# Patient Record
Sex: FEMALE | URBAN - METROPOLITAN AREA
[De-identification: names, ages, dates, MRNs, and addresses within clinical notes are randomized per-mention and may not be internally consistent; named-entity substitution may affect disease eponyms.]

---

## 2022-05-20 ENCOUNTER — APPOINTMENT (RX ONLY)
Dept: URBAN - METROPOLITAN AREA CLINIC 124 | Facility: CLINIC | Age: 37
Setting detail: DERMATOLOGY
End: 2022-05-20

## 2022-05-20 DIAGNOSIS — L81.4 OTHER MELANIN HYPERPIGMENTATION: ICD-10-CM

## 2022-05-20 DIAGNOSIS — Z71.89 OTHER SPECIFIED COUNSELING: ICD-10-CM

## 2022-05-20 DIAGNOSIS — L81.0 POSTINFLAMMATORY HYPERPIGMENTATION: ICD-10-CM

## 2022-05-20 DIAGNOSIS — D22 MELANOCYTIC NEVI: ICD-10-CM

## 2022-05-20 DIAGNOSIS — D18.0 HEMANGIOMA: ICD-10-CM

## 2022-05-20 DIAGNOSIS — L57.3 POIKILODERMA OF CIVATTE: ICD-10-CM

## 2022-05-20 PROBLEM — D22.5 MELANOCYTIC NEVI OF TRUNK: Status: ACTIVE | Noted: 2022-05-20

## 2022-05-20 PROBLEM — D18.01 HEMANGIOMA OF SKIN AND SUBCUTANEOUS TISSUE: Status: ACTIVE | Noted: 2022-05-20

## 2022-05-20 PROCEDURE — 99203 OFFICE O/P NEW LOW 30 MIN: CPT

## 2022-05-20 PROCEDURE — ? COUNSELING

## 2022-05-20 PROCEDURE — ? SUNSCREEN RECOMMENDATIONS

## 2022-05-20 PROCEDURE — ? TREATMENT REGIMEN

## 2022-05-20 ASSESSMENT — LOCATION ZONE DERM: LOCATION ZONE: TRUNK

## 2022-05-20 ASSESSMENT — LOCATION DETAILED DESCRIPTION DERM
LOCATION DETAILED: LEFT RIB CAGE
LOCATION DETAILED: EPIGASTRIC SKIN
LOCATION DETAILED: SUPERIOR THORACIC SPINE
LOCATION DETAILED: UPPER STERNUM
LOCATION DETAILED: LEFT INFERIOR UPPER BACK
LOCATION DETAILED: LEFT MEDIAL SUPERIOR CHEST

## 2022-05-20 ASSESSMENT — LOCATION SIMPLE DESCRIPTION DERM
LOCATION SIMPLE: UPPER BACK
LOCATION SIMPLE: ABDOMEN
LOCATION SIMPLE: CHEST
LOCATION SIMPLE: LEFT UPPER BACK

## 2024-06-14 ENCOUNTER — OFFICE VISIT (OUTPATIENT)
Dept: URGENT CARE | Facility: CLINIC | Age: 39
End: 2024-06-14
Payer: COMMERCIAL

## 2024-06-14 VITALS
HEIGHT: 60 IN | DIASTOLIC BLOOD PRESSURE: 82 MMHG | BODY MASS INDEX: 29.06 KG/M2 | WEIGHT: 148 LBS | HEART RATE: 100 BPM | SYSTOLIC BLOOD PRESSURE: 114 MMHG | TEMPERATURE: 98 F | RESPIRATION RATE: 16 BRPM | OXYGEN SATURATION: 96 %

## 2024-06-14 DIAGNOSIS — R05.9 COUGH, UNSPECIFIED TYPE: ICD-10-CM

## 2024-06-14 DIAGNOSIS — R06.2 WHEEZING: ICD-10-CM

## 2024-06-14 DIAGNOSIS — J18.9 PNEUMONIA OF RIGHT LOWER LOBE DUE TO INFECTIOUS ORGANISM: Primary | ICD-10-CM

## 2024-06-14 LAB
CTP QC/QA: YES
SARS-COV-2 AG RESP QL IA.RAPID: NEGATIVE

## 2024-06-14 PROCEDURE — 96372 THER/PROPH/DIAG INJ SC/IM: CPT | Mod: S$GLB,,, | Performed by: STUDENT IN AN ORGANIZED HEALTH CARE EDUCATION/TRAINING PROGRAM

## 2024-06-14 PROCEDURE — 71046 X-RAY EXAM CHEST 2 VIEWS: CPT | Mod: 26,,, | Performed by: RADIOLOGY

## 2024-06-14 PROCEDURE — 71046 X-RAY EXAM CHEST 2 VIEWS: CPT | Mod: TC,,, | Performed by: STUDENT IN AN ORGANIZED HEALTH CARE EDUCATION/TRAINING PROGRAM

## 2024-06-14 PROCEDURE — 99204 OFFICE O/P NEW MOD 45 MIN: CPT | Mod: 25,S$GLB,, | Performed by: STUDENT IN AN ORGANIZED HEALTH CARE EDUCATION/TRAINING PROGRAM

## 2024-06-14 PROCEDURE — 87811 SARS-COV-2 COVID19 W/OPTIC: CPT | Mod: QW,S$GLB,, | Performed by: STUDENT IN AN ORGANIZED HEALTH CARE EDUCATION/TRAINING PROGRAM

## 2024-06-14 RX ORDER — ALBUTEROL SULFATE 90 UG/1
2 AEROSOL, METERED RESPIRATORY (INHALATION) EVERY 6 HOURS PRN
Qty: 6.7 G | Refills: 0 | Status: SHIPPED | OUTPATIENT
Start: 2024-06-14 | End: 2025-06-14

## 2024-06-14 RX ORDER — CEFDINIR 300 MG/1
300 CAPSULE ORAL 2 TIMES DAILY
Qty: 8 CAPSULE | Refills: 0 | Status: SHIPPED | OUTPATIENT
Start: 2024-06-14 | End: 2024-06-18

## 2024-06-14 RX ORDER — PREDNISONE 20 MG/1
20 TABLET ORAL DAILY
Qty: 5 TABLET | Refills: 0 | Status: SHIPPED | OUTPATIENT
Start: 2024-06-14 | End: 2024-06-19

## 2024-06-14 RX ORDER — CEFTRIAXONE 1 G/1
1 INJECTION, POWDER, FOR SOLUTION INTRAMUSCULAR; INTRAVENOUS
Status: COMPLETED | OUTPATIENT
Start: 2024-06-14 | End: 2024-06-14

## 2024-06-14 RX ORDER — DOXYCYCLINE 100 MG/1
100 CAPSULE ORAL 2 TIMES DAILY
Qty: 10 CAPSULE | Refills: 0 | Status: SHIPPED | OUTPATIENT
Start: 2024-06-14 | End: 2024-06-19

## 2024-06-14 RX ORDER — LIDOCAINE HYDROCHLORIDE 10 MG/ML
2 INJECTION INFILTRATION; PERINEURAL
Status: COMPLETED | OUTPATIENT
Start: 2024-06-14 | End: 2024-06-14

## 2024-06-14 RX ORDER — BROMPHENIRAMINE MALEATE, PSEUDOEPHEDRINE HYDROCHLORIDE, AND DEXTROMETHORPHAN HYDROBROMIDE 2; 30; 10 MG/5ML; MG/5ML; MG/5ML
5-10 SYRUP ORAL
Qty: 120 ML | Refills: 0 | Status: SHIPPED | OUTPATIENT
Start: 2024-06-14

## 2024-06-14 RX ADMIN — CEFTRIAXONE 1 G: 1 INJECTION, POWDER, FOR SOLUTION INTRAMUSCULAR; INTRAVENOUS at 09:06

## 2024-06-14 RX ADMIN — LIDOCAINE HYDROCHLORIDE 2 ML: 10 INJECTION INFILTRATION; PERINEURAL at 09:06

## 2024-06-14 NOTE — PATIENT INSTRUCTIONS
Please follow up with your primary care provider within 2-5 days if your signs and symptoms have not resolved or worsen.     If your condition worsens or fails to improve we recommend that you receive another evaluation at the emergency room immediately or contact your primary medical clinic to discuss your concerns.   You must understand that you have received an Urgent Care treatment only and that you may be released before all of your medical problems are known or treated. You, the patient, will arrange for follow up care as instructed.        You start the doxycycline today.  You can start the cefdinir tomorrow.  You can also start the prednisone today.  You can take 5-10 mL of the Bromfed cough syrup every 6-8 hours.  Please keep in mind that the cough medication can make you sleepy.  I am also sending you in inhaler to use 1-2 puffs every 6 hours as needed for any shortness of breath, wheezing, coughing spasms.  Please be sure to let your PCP know that you were diagnosed with a pneumonia and if you do not feel better you will need a repeat chest x-ray.

## 2024-06-14 NOTE — PROGRESS NOTES
Subjective:      Patient ID: Farrah Gilbert is a 38 y.o. female.    Vitals:  height is 5' (1.524 m) and weight is 67.1 kg (148 lb). Her temperature is 98.2 °F (36.8 °C). Her blood pressure is 114/82 and her pulse is 100. Her respiration is 16 and oxygen saturation is 96%.     Chief Complaint: Cough    Pt states she has had a persistent cough since Saturday, she felt like she had a little fever the first day but nothing else just cough. Did telehealth visit on Monday, She was given benzonatate for cough and desloratidine but it did not help. Also states her son was sick before her and they told him it was bronchitis. Two days has temp of 102. No fever since then. Her mucous is clear white and sometimes is tinged with blood. When she exhales she feels a crackle. The coughing is preventing her from sleeping.     Patient states that she has had keflex before and has never has an allergic reaction to it before. Her twin is allergic to penicillins and she was told that she should also avoid penicillins. She has never has a allergic reaction to penicillins.     Cough  This is a new problem. The current episode started in the past 7 days. The cough is Productive of blood-tinged sputum. Pertinent negatives include no chest pain, fever, sore throat or shortness of breath. Risk factors for lung disease include travel. She has tried prescription cough suppressant for the symptoms.     Constitution: Negative for fever.   HENT:  Negative for nosebleeds and sore throat.    Cardiovascular:  Negative for chest pain.   Respiratory:  Positive for cough and sputum production. Negative for shortness of breath and asthma.    Allergic/Immunologic: Negative for asthma.      Objective:     Physical Exam   HENT:   Head: Normocephalic and atraumatic.   Ears:   Right Ear: Tympanic membrane normal.   Left Ear: Tympanic membrane normal.   Nose: Nose normal.   Mouth/Throat: Mucous membranes are moist. Oropharynx is clear.   Eyes: Conjunctivae  are normal. Pupils are equal, round, and reactive to light.   Cardiovascular: Normal rate, regular rhythm and normal heart sounds.   Pulmonary/Chest: Effort normal. She has decreased breath sounds in the right lower field. She has wheezes in the right lower field.   Coarse breath sounds heard in the RLL         Comments: Coarse breath sounds heard in the RLL    Abdominal: Normal appearance.   Lymphadenopathy:        Head (right side): No submental, no submandibular, no tonsillar, no preauricular, no posterior auricular and no occipital adenopathy present.        Head (left side): No submental, no submandibular, no tonsillar, no preauricular, no posterior auricular and no occipital adenopathy present.   Neurological: She is alert.   Psychiatric: Her behavior is normal. Mood normal.     Results for orders placed or performed in visit on 06/14/24   SARS Coronavirus 2 Antigen, POCT Manual Read   Result Value Ref Range    SARS Coronavirus 2 Antigen Negative Negative     Acceptable Yes     XR CHEST PA AND LATERAL    Result Date: 6/14/2024  EXAM:  XR CHEST PA AND LATERAL CLINICAL INDICATION: Fever. Cough FINDINGS: No comparison studies are available.   There is a patchy right lower lobe infiltrate. The lungs are otherwise clear. The cardiac silhouette size is normal. The trachea is midline and the mediastinal width is normal. Negative for effusion or pneumothorax. Pulmonary vasculature is normal. Negative for osseous abnormalities.  Mildly tortuous aorta.  Marginal spondylosis.     1. Patchy right lower lobe pneumonia, for which a followup chest x-ray in 4 to 6 weeks is recommended to ensure resolution after adequate treatment. 2.  Incidental findings as noted above. Finalized on: 6/14/2024 8:43 AM By:  Yandel Rajput MD [facility]# [id number]      2024-06-14 08:[id number]    [facility]    Assessment:     1. Pneumonia of right lower lobe due to infectious organism    2. Cough, unspecified type    3. Wheezing        Plan:      -handout given  Pneumonia of right lower lobe due to infectious organism  -     cefTRIAXone injection 1 g  -     LIDOcaine HCL 10 mg/ml (1%) injection 2 mL  -     predniSONE (DELTASONE) 20 MG tablet; Take 1 tablet (20 mg total) by mouth once daily. for 5 days  Dispense: 5 tablet; Refill: 0  -     doxycycline (VIBRAMYCIN) 100 MG Cap; Take 1 capsule (100 mg total) by mouth 2 (two) times daily. for 5 days  Dispense: 10 capsule; Refill: 0  -     cefdinir (OMNICEF) 300 MG capsule; Take 1 capsule (300 mg total) by mouth 2 (two) times daily. for 4 days  Dispense: 8 capsule; Refill: 0    Cough, unspecified type  -     SARS Coronavirus 2 Antigen, POCT Manual Read  -     XR CHEST PA AND LATERAL; Future; Expected date: 06/14/2024  -     brompheniramine-pseudoeph-DM (BROMFED DM) 2-30-10 mg/5 mL Syrp; Take 5-10 mLs by mouth every 6 to 8 hours as needed (cough).  Dispense: 120 mL; Refill: 0    Wheezing  -     albuterol (VENTOLIN HFA) 90 mcg/actuation inhaler; Inhale 2 puffs into the lungs every 6 (six) hours as needed for Wheezing or Shortness of Breath (coughing spams). Rescue  Dispense: 6.7 g; Refill: 0        Please follow up with your primary care provider within 2-5 days if your signs and symptoms have not resolved or worsen.     If your condition worsens or fails to improve we recommend that you receive another evaluation at the emergency room immediately or contact your primary medical clinic to discuss your concerns.   You must understand that you have received an Urgent Care treatment only and that you may be released before all of your medical problems are known or treated. You, the patient, will arrange for follow up care as instructed.        You start the doxycycline today.  You can start the cefdinir tomorrow.  You can also start the prednisone today.  You can take 5-10 mL of the Bromfed cough syrup every 6-8 hours.  Please keep in mind that the cough medication can make you sleepy.  I am also sending you in  inhaler to use 1-2 puffs every 6 hours as needed for any shortness of breath, wheezing, coughing spasms.  Please be sure to let your PCP know that you were diagnosed with a pneumonia and if you do not feel better you will need a repeat chest x-ray.  Medical Decision Making:   Differential Diagnosis:   Right lower lobe pneumonia  Independently Interpreted Test(s):   I have ordered and independently interpreted X-rays - see summary below.       <> Summary of X-Ray Reading(s): . Patchy right lower lobe pneumonia, for which a followup chest x-ray in 4 to 6 weeks is recommended to ensure resolution after adequate treatment.  2.  Incidental findings as noted above.       Clinical Tests:   Lab Tests: Ordered and Reviewed       <> Summary of Lab: COVID negative  Radiological Study: Ordered and Reviewed  Urgent Care Management:  Pt states she has had a persistent cough since Saturday, she felt like she had a little fever the first day but nothing else just cough. Did telehealth visit on Monday, She was given benzonatate for cough and desloratidine but it did not help. Also states her son was sick before her and they told him it was bronchitis. Two days has temp of 102. No fever since then. Her mucous is clear white and sometimes is tinged with blood. When she exhales she feels a crackle. The coughing is preventing her from sleeping.   Patient states that she has has keflex before and has never has an allergic reaction to it before. Her twin is allergic to penicillins and she was told that she should also avoid penicillins. She has never has a allergic reaction to penicillins.   Vitals were within normal limits.  Physical Exam   HENT:   Head: Normocephalic and atraumatic.   Ears:   Right Ear: Tympanic membrane normal.   Left Ear: Tympanic membrane normal.   Nose: Nose normal.   Mouth/Throat: Mucous membranes are moist. Oropharynx is clear.   Eyes: Conjunctivae are normal. Pupils are equal, round, and reactive to light.    Cardiovascular: Normal rate, regular rhythm and normal heart sounds.   Pulmonary/Chest: Effort normal. She has decreased breath sounds in the right lower field. She has wheezes in the right lower field.   Coarse breath sounds heard in the RLL         Comments: Coarse breath sounds heard in the RLL    Abdominal: Normal appearance.   Lymphadenopathy:        Head (right side): No submental, no submandibular, no tonsillar, no preauricular, no posterior auricular and no occipital adenopathy present.        Head (left side): No submental, no submandibular, no tonsillar, no preauricular, no posterior auricular and no occipital adenopathy present.   Neurological: She is alert.   Psychiatric: Her behavior is normal. Mood normal.   The patient was given 1 g Rocephin shot in office.  I sent the patient home with 4 more days of cefdinir and 5 days of doxycycline.  I also sent the patient home with 5 days of prednisone and some cough medication as well.  I told the patient that she needs to follow up with her PCP and to let the individual know that she has been diagnosed with pneumonia.  ED and return precautions were given.  The patient vocalized understanding.